# Patient Record
Sex: FEMALE
[De-identification: names, ages, dates, MRNs, and addresses within clinical notes are randomized per-mention and may not be internally consistent; named-entity substitution may affect disease eponyms.]

---

## 2019-04-05 ENCOUNTER — HOSPITAL ENCOUNTER (EMERGENCY)
Dept: HOSPITAL 31 - C.ER | Age: 34
Discharge: HOME | End: 2019-04-05
Payer: COMMERCIAL

## 2019-04-05 VITALS
SYSTOLIC BLOOD PRESSURE: 95 MMHG | TEMPERATURE: 98.8 F | HEART RATE: 92 BPM | OXYGEN SATURATION: 96 % | DIASTOLIC BLOOD PRESSURE: 63 MMHG | RESPIRATION RATE: 20 BRPM

## 2019-04-05 DIAGNOSIS — N13.2: Primary | ICD-10-CM

## 2019-04-05 LAB
ALBUMIN SERPL-MCNC: 5 G/DL (ref 3.5–5)
ALBUMIN/GLOB SERPL: 1.6 {RATIO} (ref 1–2.1)
ALT SERPL-CCNC: 8 U/L (ref 9–52)
AST SERPL-CCNC: 32 U/L (ref 14–36)
BACTERIA #/AREA URNS HPF: (no result) /[HPF]
BASOPHILS # BLD AUTO: 0.1 K/UL (ref 0–0.2)
BASOPHILS NFR BLD: 0.4 % (ref 0–2)
BILIRUB UR-MCNC: NEGATIVE MG/DL
BUN SERPL-MCNC: 15 MG/DL (ref 7–17)
CALCIUM SERPL-MCNC: 10.2 MG/DL (ref 8.6–10.4)
EOSINOPHIL # BLD AUTO: 0 K/UL (ref 0–0.7)
EOSINOPHIL NFR BLD: 0.1 % (ref 0–4)
ERYTHROCYTE [DISTWIDTH] IN BLOOD BY AUTOMATED COUNT: 12.3 % (ref 11.5–14.5)
GFR NON-AFRICAN AMERICAN: > 60
GLUCOSE UR STRIP-MCNC: NORMAL MG/DL
HCG,QUALITATIVE URINE: NEGATIVE
HGB BLD-MCNC: 14.9 G/DL (ref 11–16)
LEUKOCYTE ESTERASE UR-ACNC: (no result) LEU/UL
LIPASE: 83 U/L (ref 23–300)
LYMPHOCYTE: 6 % (ref 20–40)
LYMPHOCYTES # BLD AUTO: 0.6 K/UL (ref 1–4.3)
LYMPHOCYTES NFR BLD AUTO: 4.1 % (ref 20–40)
MCH RBC QN AUTO: 30.1 PG (ref 27–31)
MCHC RBC AUTO-ENTMCNC: 33.4 G/DL (ref 33–37)
MCV RBC AUTO: 90.1 FL (ref 81–99)
MONOCYTE: 2 % (ref 0–10)
MONOCYTES # BLD: 0.3 K/UL (ref 0–0.8)
MONOCYTES NFR BLD: 2 % (ref 0–10)
NEUTROPHILS # BLD: 14.2 K/UL (ref 1.8–7)
NEUTROPHILS NFR BLD AUTO: 92 % (ref 50–75)
NEUTROPHILS NFR BLD AUTO: 93.4 % (ref 50–75)
NRBC BLD AUTO-RTO: 0 % (ref 0–2)
PH UR STRIP: 8 [PH] (ref 5–8)
PLATELET # BLD EST: NORMAL 10*3/UL
PLATELET # BLD: 291 K/UL (ref 130–400)
PMV BLD AUTO: 8.8 FL (ref 7.2–11.7)
PROT UR STRIP-MCNC: (no result) MG/DL
RBC # BLD AUTO: 4.94 MIL/UL (ref 3.8–5.2)
RBC # UR STRIP: NEGATIVE /UL
SP GR UR STRIP: 1.03 (ref 1–1.03)
SQUAMOUS EPITHIAL: 22 /HPF (ref 0–5)
TOTAL CELLS COUNTED BLD: 100
UROBILINOGEN UR-MCNC: NORMAL MG/DL (ref 0.2–1)
WBC # BLD AUTO: 15.2 K/UL (ref 4.8–10.8)

## 2019-04-05 PROCEDURE — 85025 COMPLETE CBC W/AUTO DIFF WBC: CPT

## 2019-04-05 PROCEDURE — 74177 CT ABD & PELVIS W/CONTRAST: CPT

## 2019-04-05 PROCEDURE — 96375 TX/PRO/DX INJ NEW DRUG ADDON: CPT

## 2019-04-05 PROCEDURE — 87086 URINE CULTURE/COLONY COUNT: CPT

## 2019-04-05 PROCEDURE — 83690 ASSAY OF LIPASE: CPT

## 2019-04-05 PROCEDURE — 96361 HYDRATE IV INFUSION ADD-ON: CPT

## 2019-04-05 PROCEDURE — 80053 COMPREHEN METABOLIC PANEL: CPT

## 2019-04-05 PROCEDURE — 84703 CHORIONIC GONADOTROPIN ASSAY: CPT

## 2019-04-05 PROCEDURE — 81001 URINALYSIS AUTO W/SCOPE: CPT

## 2019-04-05 PROCEDURE — 99284 EMERGENCY DEPT VISIT MOD MDM: CPT

## 2019-04-05 PROCEDURE — 96374 THER/PROPH/DIAG INJ IV PUSH: CPT

## 2019-04-05 NOTE — C.PDOC
History Of Present Illness


33 year old female presents to the ED for evaluation of left-sided periumbilical

pain which began two days ago and worsened at 0300 today. Patient reports 

associated nausea, vomiting and several episodes of diarrhea. Patient initially 

went to the urgent care, but was sent to the ED for immediate evaluation. She 

notes her LMP was during the beginning of March. She denies fever, chills, 

urinary complaints. 


Time Seen by Provider: 04/05/19 11:33


Chief Complaint (Nursing): Abdominal Pain


History Per: Patient


History/Exam Limitations: no limitations


Onset/Duration Of Symptoms: Hrs


Current Symptoms Are (Timing): Still Present





Past Medical History


Reviewed: Historical Data, Nursing Documentation, Vital Signs


Vital Signs: 





                                Last Vital Signs











Temp  98.8 F   04/05/19 15:05


 


Pulse  92 H  04/05/19 15:05


 


Resp  20   04/05/19 15:05


 


BP  95/63 L  04/05/19 15:05


 


Pulse Ox  96   04/05/19 15:05














- Medical History


PMH: No Chronic Diseases


Surgical History: No Surg Hx


Family History: States: Unknown Family Hx





- Social History


Hx Alcohol Use: Yes


Hx Substance Use: No





- Immunization History


Hx Tetanus Toxoid Vaccination: No


Hx Influenza Vaccination: No


Hx Pneumococcal Vaccination: No





Review Of Systems


Constitutional: Negative for: Fever, Chills


Gastrointestinal: Positive for: Nausea, Vomiting, Abdominal Pain (left-sided, 

periumbilical ), Diarrhea





Physical Exam





- Physical Exam


Appears: Non-toxic, Other (in moderate-severe pain, moaning )


Skin: Normal Color, Warm, Dry


Head: Atraumatic, Normacephalic


Eye(s): bilateral: Normal Inspection


Oral Mucosa: Moist


Neck: Supple


Chest: Symmetrical, No Deformity, No Tenderness


Cardiovascular: Rhythm Regular, No Murmur, Other (tachycardic)


Respiratory: Normal Breath Sounds, No Rales, No Rhonchi, No Wheezing


Gastrointestinal/Abdominal: Soft, Tenderness (left upper quadrant, left 

periumbilical ), No Guarding, No Rebound


Back: CVA Tenderness (left-sided )


Extremity: Normal ROM, Capillary Refill (less than 2 seconds )


Neurological/Psych: Oriented x3, Normal Speech, Normal Cognition





ED Course And Treatment





- Laboratory Results


Result Diagrams: 


                                 04/05/19 11:57





                                 04/05/19 11:57


Lab Results: 





                                        











Total Bilirubin  1.1 mg/dL (0.2-1.3)   04/05/19  11:57    


 


AST  32 U/L (14-36)   04/05/19  11:57    


 


ALT  8 U/L (9-52)  L  04/05/19  11:57    


 


Alkaline Phosphatase  50 U/L ()   04/05/19  11:57    


 


Total Protein  8.2 g/dL (6.3-8.3)   04/05/19  11:57    


 


Albumin  5.0 g/dL (3.5-5.0)   04/05/19  11:57    


 


Globulin  3.2 gm/dL (2.2-3.9)   04/05/19  11:57    


 


Albumin/Globulin Ratio  1.6  (1.0-2.1)   04/05/19  11:57    








                                        











Lipase  83 U/L ()   04/05/19  11:57    








                                        











Urine Color  Yellow  (YELLOW)   04/05/19  11:57    


 


Urine Clarity  Hazy  (Clear)   04/05/19  11:57    


 


Urine pH  8.0  (5.0-8.0)   04/05/19  11:57    


 


Ur Specific Gravity  1.027  (1.003-1.030)   04/05/19  11:57    


 


Urine Protein  2+ mg/dL (NEGATIVE)  H  04/05/19  11:57    


 


Urine Glucose (UA)  Normal mg/dL (Normal)   04/05/19  11:57    


 


Urine Ketones  1+ mg/dL (NEGATIVE)  H  04/05/19  11:57    


 


Urine Blood  Negative  (NEGATIVE)   04/05/19  11:57    


 


Urine Nitrate  Negative  (NEGATIVE)   04/05/19  11:57    


 


Urine Bilirubin  Negative  (NEGATIVE)   04/05/19  11:57    


 


Urine Urobilinogen  Normal mg/dL (0.2-1.0)   04/05/19  11:57    


 


Ur Leukocyte Esterase  Neg Gregg/uL (Negative)   04/05/19  11:57    


 


Urine WBC (Auto)  2 /hpf (0-5)   04/05/19  11:57    


 


Urine RBC (Auto)  6 /hpf (0-3)  H  04/05/19  11:57    


 


Ur Squamous Epith Cells  22 /hpf (0-5)  H  04/05/19  11:57    


 


Urine Bacteria  Occ  (<OCC)  H  04/05/19  11:57    


 


Urine HCG, Qual  Negative  (NEGATIVE)   04/05/19  11:57    








                                        











Urine HCG, Qual  Negative  (NEGATIVE)   04/05/19  11:57    











O2 Sat by Pulse Oximetry: 96 (on RA )


Pulse Ox Interpretation: Normal





- CT Scan/US


  ** CT A/P


Other Rad Studies (CT/US): Read By Radiologist, Radiology Report Reviewed


CT/US Interpretation: Date of service:  04/05/2019.  PROCEDURE:  CT Abdomen and 

Pelvis with contrast.  HISTORY:  SEVERE UPPER ABDOMINAL/LUQ PAIN.  Negative 

pregnancy test (concurrent with this examination).  COMPARISON:  None.  

TECHNIQUE:  Intravenous contrast dose: 100 cc Visipaque 320.  Radiation dose:  

Total exam DLP = 324.26 mGy-cm.  This CT exam was performed using one or more of

the following dose reduction techniques: Automated exposure control, adjustment 

of the mA and/or kV according to patient size, and/or use of iterative 

reconstruction technique.  FINDINGS:  LOWER THORAX:  Dependent atelectasis left 

lower lobe.  LIVER:  Unremarkable. No gross lesion or ductal dilatation.  

GALLBLADDER AND BILE DUCTS:  Unremarkable.  PANCREAS:  Unremarkable. No gross 

lesion or ductal dilatation.  SPLEEN:  Unremarkable.  ADRENALS:  Unremarkable. 

No mass.  KIDNEYS AND URETERS:  Left kidney and ureter: Obstructing calculus 3 

mm mid left ureter.  Proximal hydroureter, hydronephrosis.  Edematous left 

kidney with perinephric fluid.  Additional midpole calculus 2 mm.  Left kidney 

and ureter unremarkable.  No hydronephrosis. No solid mass.  VASCULATURE:  

Unremarkable. No aortic aneurysm. No atherosclerotic calcification or mural 

plaque present.  BOWEL:  Unremarkable. No obstruction. No gross mural 

thickening.  APPENDIX:  Normal appendix.  PERITONEUM:  Trace free fluid 

identified in the pelvis/cul de sac. No free air.  LYMPH NODES:  Unremarkable. 

No enlarged lymph nodes.  BLADDER:  Unremarkable.  REPRODUCTIVE:  Unremarkable. 

Incidental finding(s): Small contrast-enhancing follicles on the right with 

deformity suggesting recent rupture.  BONES:  No acute fracture.  OTHER 

FINDINGS:  None.  IMPRESSION:  High-grade obstruction of the left ureter 

secondary to 3 mm calculus.  Proximal hydroureter, hydronephrosis noted.  The 

left kidney is edematous with perinephric fluid.  Additional benign and/or 

incidental findings described above.


Progress Note: Bloodwork, urinalysis, CT A/P ordered and reviewed. CT shows left

kidney stone.  Flomax PO, Morphine IVP, Toradol IVP and IV Fluids given.  On 

reassessment, patient is resting comfortably, showing no signs of distress and 

reports an improvement in her symptoms. Patient is stable for discharge. Advised

to follow up with PMD within 1-2 within 1-2 days for further evaluation.





Disposition


Counseled Patient/Family Regarding: Studies Performed, Diagnosis, Need For 

Followup, Rx Given





- Disposition


Referrals: 


Kemal Logan MD [Staff Provider] - 


Disposition: HOME/ ROUTINE


Disposition Time: 15:20


Condition: STABLE


Additional Instructions: 


FOLLOW UP WITH UROLOGY WITHIN 1 WEEK





DRINK PLENTY OF CLEAR FLUIDS





USE PAIN MEDICATION AS NEEDED





RETURN TO ER IF SYMPTOMS WORSEN 


Prescriptions: 


Naproxen 375 mg PO BID PRN #20 tablet


 PRN Reason: pain


oxyCODONE/Acetaminophen [Percocet 5/325 mg Tab] 1 tab PO QID PRN #15 tab


 PRN Reason: Pain


Tamsulosin [Flomax] 0.4 mg PO DAILY #5 cap


Instructions:  Renal Colic (DC)


Forms:  [employer] (English)


Print Language: ENGLISH





- Clinical Impression


Clinical Impression: 


 Renal colic on left side, Hydronephrosis, left








- Scribe Statement


The provider has reviewed the documentation as recorded by the Scribe (Marlin Beltran)


Provider Attestation: 








All medical record entries made by the Scribe were at my direction and 

personally dictated by me. I have reviewed the chart and agree that the record 

accurately reflects my personal performance of the history, physical exam, 

medical decision making, and the department course for this patient. I have also

 personally directed, reviewed, and agree with the discharge instructions and 

disposition.

## 2019-04-05 NOTE — CT
Date of service: 



04/05/2019



PROCEDURE:  CT Abdomen and Pelvis with contrast



HISTORY:

SEVERE UPPER ABDOMINAL/LUQ PAIN



Negative pregnancy test (concurrent with this examination).



COMPARISON:

None.



TECHNIQUE:

Intravenous contrast dose: 100 cc Visipaque 320



Radiation dose:



Total exam DLP = 324.26 mGy-cm.



This CT exam was performed using one or more of the following dose 

reduction techniques: Automated exposure control, adjustment of the 

mA and/or kV according to patient size, and/or use of iterative 

reconstruction technique.



FINDINGS:



LOWER THORAX:

Dependent atelectasis left lower lobe. 



LIVER:

Unremarkable. No gross lesion or ductal dilatation. 



GALLBLADDER AND BILE DUCTS:

Unremarkable. 



PANCREAS:

Unremarkable. No gross lesion or ductal dilatation.



SPLEEN:

Unremarkable. 



ADRENALS:

Unremarkable. No mass. 



KIDNEYS AND URETERS:

Left kidney and ureter: Obstructing calculus 3 mm mid left ureter.  

Proximal hydroureter, hydronephrosis.  Edematous left kidney with 

perinephric fluid.  Additional midpole calculus 2 mm.  



Left kidney and ureter unremarkable.  No hydronephrosis. No solid 

mass. 



VASCULATURE:

Unremarkable. No aortic aneurysm. No atherosclerotic calcification or 

mural plaque present.



BOWEL:

Unremarkable. No obstruction. No gross mural thickening. 



APPENDIX:

Normal appendix. 



PERITONEUM:

Trace free fluid identified in the pelvis/cul de sac. No free air. 



LYMPH NODES:

Unremarkable. No enlarged lymph nodes. 



BLADDER:

Unremarkable. 



REPRODUCTIVE:

Unremarkable. Incidental finding(s): Small contrast-enhancing 

follicles on the right with deformity suggesting recent rupture. 



BONES:

No acute fracture. 



OTHER FINDINGS:

None.



IMPRESSION:

High-grade obstruction of the left ureter secondary to 3 mm calculus. 

 Proximal hydroureter, hydronephrosis noted.  The left kidney is 

edematous with perinephric fluid.



Additional benign and/or incidental findings described above.